# Patient Record
Sex: FEMALE | Race: WHITE | NOT HISPANIC OR LATINO | Employment: UNEMPLOYED | URBAN - METROPOLITAN AREA
[De-identification: names, ages, dates, MRNs, and addresses within clinical notes are randomized per-mention and may not be internally consistent; named-entity substitution may affect disease eponyms.]

---

## 2017-04-13 ENCOUNTER — HOSPITAL ENCOUNTER (OUTPATIENT)
Dept: MAMMOGRAPHY | Facility: MEDICAL CENTER | Age: 47
Discharge: HOME | End: 2017-04-13
Payer: COMMERCIAL

## 2017-04-13 DIAGNOSIS — Z12.31 ENCOUNTER FOR SCREENING MAMMOGRAM FOR MALIGNANT NEOPLASM OF BREAST: ICD-10-CM

## 2017-04-13 PROCEDURE — G0202 SCR MAMMO BI INCL CAD: HCPCS

## 2017-05-16 ENCOUNTER — ALLSCRIPTS OFFICE VISIT (OUTPATIENT)
Dept: OTHER | Facility: OTHER | Age: 47
End: 2017-05-16

## 2017-05-25 ENCOUNTER — GENERIC CONVERSION - ENCOUNTER (OUTPATIENT)
Dept: OTHER | Facility: OTHER | Age: 47
End: 2017-05-25

## 2017-05-25 DIAGNOSIS — D64.9 ANEMIA: ICD-10-CM

## 2018-01-11 NOTE — MISCELLANEOUS
Message   Recorded as Task   Date: 04/19/2016 02:45 PM, Created By: Mary Dover   Task Name: Follow Up   Assigned To: Sariah Green   Regarding Patient: Brandy Jaquez, Status: In Progress   Comment:    Mary Dover - 19 Apr 2016 2:45 PM     TASK CREATED  Caller: Self; General Medical Question; [phone] (Mobile Phone)  pt called - past 3yrs been trying to lose weight & has dropped 20lbs & not yet to her goal weight - has tried everything under the sun to help lose - concerned if something else is happening - on BC pill - did have her thyroid previously checked w/Dr Ward Going - she'd like to discuss further if there is any insights to help her - she will be seeing Ed Going in may for her yearly - just thought give us a shot with answering questions - please eleanor  [phone]   Jyotsna Gordon - 19 Apr 2016 3:11 PM     TASK IN PROGRESS   Jyotsna Gordon - 19 Apr 2016 3:17 PM     TASK EDITED  pt is having great difficulty with weight loss    exercises vigorously    has had metabolism checked  [employer] weight watchers, etc  on gildess    very discouraged    can think of no other reason except the pilll    has yrly sched in may with rk, but does not  want to wait that long    req any further instructions prior to Jyotsna Spicer - 19 Apr 2016 3:18 PM     TASK REASSIGNED: Previously Assigned To Aiden Manzo - 20 Apr 2016 9:16 AM     TASK REPLIED TO: Previously Assigned To Liat Florence  OK to stop OCP   will reassess at annual visit   will need to use condoms   Jyotsna Gordon - 20 Apr 2016 10:01 AM     TASK EDITED  pt astates she will continue pill until after she discusses options at yearly with rk        Active Problems    1  Candidiasis (112 9) (B37 9)   2  Contraceptives (V25 02)   3  Encounter for routine gynecological examination (V72 31) (Z01 419)   4  Encounter for screening mammogram for malignant neoplasm of breast (V76 12)   (Z12 31)   5   Screening for human papillomavirus (HPV) (V73 81) (Z11 51)   6  Weight gain (783 1) (R63 5)   7  Weight loss (783 21) (R63 4)    Current Meds   1  Gildess FE 1 5/30 1 5-30 MG-MCG Oral Tablet; TAKE ONE (1) TABLET daily; Therapy: 90QWD2578 to (Evaluate:13Jun2016)  Requested for: 18Apr2016; Last   Rx:18Apr2016 Ordered   2  Multi-Vitamin Oral Tablet; TAKE 1 TABLET DAILY; Therapy: (Recorded:16Apr2014) to Recorded    Allergies    1   Penicillins    Signatures   Electronically signed by : Parisa Parra, ; Apr 20 2016 10:02AM EST                       (Author)

## 2018-01-11 NOTE — MISCELLANEOUS
Message   Recorded as Task   Date: 05/24/2017 02:58 PM, Created By: Olivia Little   Task Name: Follow Up   Assigned To: Tres Townsend   Regarding Patient: Nimo Lane, Status: Active   CommentLajean Toni - 24 May 2017 2:58 PM     TASK CREATED  inform pt Hgb 10  5  Adrien Ped anemic  To begin OTC iron supplement, iron-rich foods  Keep menstrual calendar  Repeat CBC(no diff) 3 months  Advise Aleve for 2 days prior to menses and through first 2 days to decrease flow  Tres Townsend - 25 May 2017 8:38 AM     TASK EDITED  pt is already taking an iron supplement because her PCP is monitoring her blood work and she already has a slip for repeat blood work from her PCP to be done in August  she will make sure RK gets those results as well        Active Problems    1  Anemia (285 9) (D64 9)   2  Candidiasis (112 9) (B37 9)   3  Contraceptives (V25 02)   4  Dysmenorrhea (625 3) (N94 6)   5  Encounter for gynecological examination with abnormal finding (V72 31) (Z01 411)   6  Encounter for gynecological examination without abnormal finding (V72 31) (Z01 419)   7  Encounter for insertion of ParaGard IUD (V25 11) (Z30 430)   8  Encounter for IUD insertion (V25 11) (Z30 430)   9  Encounter for routine gynecological examination (V72 31) (Z01 419)   10  Encounter for screening mammogram for malignant neoplasm of breast (V76 12)    (Z12 31)   11  Fatigue (780 79) (R53 83)   12  IUD check up (V25 42) (Z30 431)   13  Screening for human papillomavirus (HPV) (V73 81) (Z11 51)   14  Weight gain (783 1) (R63 5)   15  Weight loss (783 21) (R63 4)    Current Meds   1  Multi-Vitamin Oral Tablet; TAKE 1 TABLET DAILY; Therapy: (Recorded:70Ovl4261) to Recorded    Allergies    1  Penicillins    Plan  Anemia    · (1) CBC/ PLT (NO DIFF);  Status:Canceled - Retrospective By Protocol Authorization;     Signatures   Electronically signed by : Murali Freedman, ; May 25 2017  8:39AM EST                       (Author)

## 2018-01-12 VITALS
WEIGHT: 158 LBS | BODY MASS INDEX: 25.39 KG/M2 | DIASTOLIC BLOOD PRESSURE: 82 MMHG | SYSTOLIC BLOOD PRESSURE: 132 MMHG | HEIGHT: 66 IN

## 2018-01-13 NOTE — MISCELLANEOUS
Message   Recorded as Task   Date: 04/14/2016 09:35 AM, Created By: System   Task Name: Rx Renew Request   Assigned To: Karen Polanco   Regarding Patient: Jason Choi, Status: Active   Comment:    System - 14 Apr 2016 9:35 AM     PHARMACY: [facility]  PATIENT: Ventura Robbins  MEDICATION: GILDESS FE 1 5-30 TABLET   Kayy Moran - 18 Apr 2016 8:42 AM     TASK REASSIGNED: Previously Assigned To [facility] - 18 Apr 2016 8:49 AM     TASK REASSIGNED: Previously Assigned To Kinjal Arreguinta - 18 Apr 2016 8:54 AM     TASK EDITED  sent rx to pharm        Active Problems    1  Candidiasis (112 9) (B37 9)   2  Contraceptives (V25 02)   3  Encounter for routine gynecological examination (V72 31) (Z01 419)   4  Encounter for screening mammogram for malignant neoplasm of breast (V76 12)   (Z12 31)   5  Screening for human papillomavirus (HPV) (V73 81) (Z11 51)   6  Weight gain (783 1) (R63 5)   7  Weight loss (783 21) (R63 4)    Current Meds   1  Gildess FE 1 5/30 1 5-30 MG-MCG Oral Tablet; TAKE ONE (1) TABLET daily; Therapy: 50COB8384 to (Evaluate:77Peh8692)  Requested for: 11QPE6268; Last   HY:[id number] Ordered   2  Multi-Vitamin Oral Tablet; TAKE 1 TABLET DAILY; Therapy: (Recorded:63Zcm8764) to Recorded    Allergies    1   Penicillins    Plan  Contraceptives    · Gildess FE 1 5/30 1 5-30 MG-MCG Oral Tablet; TAKE ONE (1) TABLET daily    Signatures   Electronically signed by : Una Richards, ; Apr 18 2016  8:54AM EST                       (Author)

## 2018-01-13 NOTE — MISCELLANEOUS
Message   Recorded as Task   Date: 04/19/2016 02:45 PM, Created By: Kelley Poon   Task Name: Follow Up   Assigned To: Tha Woodward   Regarding Patient: Evelyne Santiago, Status: In Progress   Comment:    Kelley Poon - 19 Apr 2016 2:45 PM     TASK CREATED  Caller: Self; General Medical Question; [phone] (Mobile Phone)  pt called - past 3yrs been trying to lose weight & has dropped 20lbs & not yet to her goal weight - has tried everything under the sun to help lose - concerned if something else is happening - on BC pill - did have her thyroid previously checked w/Dr Savannah Shearer - she'd like to discuss further if there is any insights to help her - she will be seeing Savannah Shearer in may for her yearly - just thought give us a shot with answering questions - please abvise  [phone]   Jyotsna Gordon - 19 Apr 2016 3:11 PM     TASK IN PROGRESS   Jyotsna Gordon - 19 Apr 2016 3:17 PM     TASK EDITED  pt is having great difficulty with weight loss    exercises vigorously    has had metabolism checked  [employer] weight watchers, etc  on gildess    very discouraged    can think of no other reason except the pilll    has yrly sched in may with rk, but does not  want to wait that long    req any further instructions prior to yrly        Active Problems    1  Candidiasis (112 9) (B37 9)   2  Contraceptives (V25 02)   3  Encounter for routine gynecological examination (V72 31) (Z01 419)   4  Encounter for screening mammogram for malignant neoplasm of breast (V76 12)   (Z12 31)   5  Screening for human papillomavirus (HPV) (V73 81) (Z11 51)   6  Weight gain (783 1) (R63 5)   7  Weight loss (783 21) (R63 4)    Current Meds   1  Gildess FE 1 5/30 1 5-30 MG-MCG Oral Tablet; TAKE ONE (1) TABLET daily; Therapy: 62ITJ3877 to (Evaluate:13Jun2016)  Requested for: 18Apr2016; Last   Rx:18Apr2016 Ordered   2  Multi-Vitamin Oral Tablet; TAKE 1 TABLET DAILY; Therapy: (Recorded:16Apr2014) to Recorded    Allergies    1  Penicillins    Signatures   Electronically signed by : Al Sims, ; Apr 19 2016  3:17PM EST                       (Author)

## 2018-01-15 NOTE — MISCELLANEOUS
Message   Recorded as Task   Date: 06/02/2016 03:02 PM, Created By: Janetta Closs   Task Name: Follow Up   Assigned To: Joao Monterroso   Regarding Patient: Denny Patel, Status: In Progress   Art Villalpando - 02 Jun 2016 3:02 PM     TASK CREATED  inform pt labs wnLeatha Bowen - 02 Jun 2016 3:05 PM     TASK IN PROGRESS   Lee Giraldo - 02 Jun 2016 3:07 PM     TASK EDITED  pt made aware of results  Active Problems    1  Candidiasis (112 9) (B37 9)   2  Contraceptives (V25 02)   3  Encounter for gynecological examination with abnormal finding (V72 31) (Z01 411)   4  Encounter for routine gynecological examination (V72 31) (Z01 419)   5  Encounter for screening mammogram for malignant neoplasm of breast (V76 12)   (Z12 31)   6  Fatigue (780 79) (R53 83)   7  Screening for human papillomavirus (HPV) (V73 81) (Z11 51)   8  Weight gain (783 1) (R63 5)   9  Weight loss (783 21) (R63 4)    Current Meds   1  Gildess FE 1 5/30 1 5-30 MG-MCG Oral Tablet; TAKE ONE (1) TABLET daily; Therapy: 12PYC4306 to (Evaluate:13Jun2016)  Requested for: 18Apr2016; Last   Rx:78Hyl4681 Ordered   2  Multi-Vitamin Oral Tablet; TAKE 1 TABLET DAILY; Therapy: (Recorded:96Hfg4209) to Recorded    Allergies    1   Penicillins    Signatures   Electronically signed by : Mika Laura LPN; Jun 2 2183  3:73SV EST                       (Author)

## 2018-03-16 ENCOUNTER — TELEPHONE (OUTPATIENT)
Dept: OBGYN CLINIC | Facility: CLINIC | Age: 48
End: 2018-03-16

## 2018-03-16 NOTE — TELEPHONE ENCOUNTER
Pt called states she found a cyst on the outside of her vagina yesterday  The first opening I see is May 2nd, is there somewhere you can fit her in so she doesn't have to wait till her yearly at the end of May? She does request to see Dr Carol Valadez

## 2018-03-20 NOTE — TELEPHONE ENCOUNTER
Pt thinks its just a blocked hair follicle  She will do warm soaks and see if it gets better on her own  If it doesn't pt will call us back to make an appt

## 2018-04-03 DIAGNOSIS — Z12.31 ENCOUNTER FOR SCREENING MAMMOGRAM FOR MALIGNANT NEOPLASM OF BREAST: ICD-10-CM

## 2018-04-30 ENCOUNTER — HOSPITAL ENCOUNTER (OUTPATIENT)
Dept: MAMMOGRAPHY | Facility: MEDICAL CENTER | Age: 48
Discharge: HOME | End: 2018-04-30
Payer: COMMERCIAL

## 2018-04-30 DIAGNOSIS — Z12.31 ENCOUNTER FOR SCREENING MAMMOGRAM FOR MALIGNANT NEOPLASM OF BREAST: ICD-10-CM

## 2018-04-30 PROCEDURE — 77067 SCR MAMMO BI INCL CAD: CPT

## 2018-05-29 ENCOUNTER — ANNUAL EXAM (OUTPATIENT)
Dept: OBGYN CLINIC | Facility: CLINIC | Age: 48
End: 2018-05-29
Payer: COMMERCIAL

## 2018-05-29 VITALS
SYSTOLIC BLOOD PRESSURE: 128 MMHG | HEIGHT: 67 IN | WEIGHT: 177 LBS | DIASTOLIC BLOOD PRESSURE: 82 MMHG | BODY MASS INDEX: 27.78 KG/M2

## 2018-05-29 DIAGNOSIS — Z12.31 ENCOUNTER FOR SCREENING MAMMOGRAM FOR MALIGNANT NEOPLASM OF BREAST: ICD-10-CM

## 2018-05-29 DIAGNOSIS — Z01.419 ENCOUNTER FOR GYNECOLOGICAL EXAMINATION WITHOUT ABNORMAL FINDING: Primary | ICD-10-CM

## 2018-05-29 PROCEDURE — 99396 PREV VISIT EST AGE 40-64: CPT | Performed by: OBSTETRICS & GYNECOLOGY

## 2018-05-29 RX ORDER — ALBUTEROL SULFATE 90 UG/1
1 AEROSOL, METERED RESPIRATORY (INHALATION) EVERY 6 HOURS
COMMUNITY

## 2018-05-29 NOTE — PROGRESS NOTES
Assessment/Plan:    No problem-specific Assessment & Plan notes found for this encounter  Diagnoses and all orders for this visit:    Encounter for screening mammogram for malignant neoplasm of breast  -     Mammo screening bilateral w cad; Future    Encounter for gynecological examination without abnormal finding    Other orders  -     albuterol (PROVENTIL HFA,VENTOLIN HFA) 90 mcg/act inhaler; Inhale 1 puff every 6 (six) hours  -     COPPER PO; 176 mg by Intrauterine route  -     Multiple Vitamin (MULTI-VITAMIN DAILY PO); Take 1 tablet by mouth daily  -     Iron-Vit C-Vit B12-Folic Acid (IRON 476 PLUS PO); Take 1 tablet by mouth        Annual examination was completed  Mammogram was ordered  We discussed strategies to help with maintenance of normal weight and healthy goals  Patient to return to the office in 1 year or as necessary  Subjective:      Patient ID: Finn Workman is a 52 y o  female  Patient returns for annual gyn visit  She has no new medical or surgical issues  She does have challenges with inability to lose weight  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  Menses are properly timed and normal in flow  Gynecologic Exam         The following portions of the patient's history were reviewed and updated as appropriate:   She  has a past medical history of Exercise-induced asthma and Migraine  She There are no active problems to display for this patient  She  has a past surgical history that includes Dilation and evacuation  Her family history includes Hypertension in her mother; Thyroid disease in her mother  She  reports that she has never smoked  She has never used smokeless tobacco  She reports that she drinks alcohol  She reports that she does not use drugs    Current Outpatient Prescriptions   Medication Sig Dispense Refill    albuterol (PROVENTIL HFA,VENTOLIN HFA) 90 mcg/act inhaler Inhale 1 puff every 6 (six) hours      COPPER  mg by Intrauterine route  Iron-Vit C-Vit B12-Folic Acid (IRON 225 PLUS PO) Take 1 tablet by mouth      Multiple Vitamin (MULTI-VITAMIN DAILY PO) Take 1 tablet by mouth daily       No current facility-administered medications for this visit  No current outpatient prescriptions on file prior to visit  No current facility-administered medications on file prior to visit  She is allergic to penicillins       Review of Systems   All other systems reviewed and are negative  Objective:      /82 (BP Location: Left arm, Patient Position: Sitting, Cuff Size: Standard)   Ht 5' 7" (1 702 m)   Wt 80 3 kg (177 lb)   LMP 05/10/2018   BMI 27 72 kg/m²          Physical Exam   Constitutional: She is oriented to person, place, and time  She appears well-developed and well-nourished  HENT:   Head: Normocephalic and atraumatic  Nose: Nose normal    Eyes: EOM are normal  Pupils are equal, round, and reactive to light  Neck: Normal range of motion  Neck supple  No thyromegaly present  Cardiovascular: Normal rate and regular rhythm  Pulmonary/Chest: Effort normal and breath sounds normal  No respiratory distress  Breasts no masses, tenderness, skin changes   Abdominal: Soft  Bowel sounds are normal  She exhibits no distension and no mass  There is no tenderness  Hernia confirmed negative in the right inguinal area and confirmed negative in the left inguinal area  Genitourinary: Vagina normal and uterus normal  No breast swelling, tenderness, discharge or bleeding  Pelvic exam was performed with patient supine  No labial fusion  There is no rash, tenderness, lesion or injury on the right labia  There is no rash, tenderness, lesion or injury on the left labia  Cervix exhibits no motion tenderness, no discharge and no friability     Genitourinary Comments: Ext genitalia nl female w/o lesions  Vag healthy without lesions or discharge  Cervix healthy w/o lesions or discharge  uterus nl size, NT, no mass  Adnexa NT, no mass Musculoskeletal: Normal range of motion  She exhibits no edema  Lymphadenopathy:        Right: No inguinal adenopathy present  Left: No inguinal adenopathy present  Neurological: She is alert and oriented to person, place, and time  She has normal reflexes  Skin: Skin is warm and dry  No rash noted  Psychiatric: She has a normal mood and affect  Her behavior is normal  Thought content normal    Nursing note and vitals reviewed

## 2019-06-05 ENCOUNTER — ANNUAL EXAM (OUTPATIENT)
Dept: OBGYN CLINIC | Facility: CLINIC | Age: 49
End: 2019-06-05
Payer: COMMERCIAL

## 2019-06-05 VITALS
DIASTOLIC BLOOD PRESSURE: 74 MMHG | BODY MASS INDEX: 29.09 KG/M2 | SYSTOLIC BLOOD PRESSURE: 122 MMHG | HEIGHT: 66 IN | WEIGHT: 181 LBS

## 2019-06-05 DIAGNOSIS — Z01.419 ENCOUNTER FOR GYNECOLOGICAL EXAMINATION (GENERAL) (ROUTINE) WITHOUT ABNORMAL FINDINGS: Primary | ICD-10-CM

## 2019-06-05 DIAGNOSIS — Z12.4 CERVICAL CANCER SCREENING: ICD-10-CM

## 2019-06-05 DIAGNOSIS — Z11.51 SCREENING FOR HUMAN PAPILLOMAVIRUS (HPV): ICD-10-CM

## 2019-06-05 DIAGNOSIS — Z12.39 SCREENING FOR MALIGNANT NEOPLASM OF BREAST: ICD-10-CM

## 2019-06-05 PROCEDURE — G0145 SCR C/V CYTO,THINLAYER,RESCR: HCPCS | Performed by: OBSTETRICS & GYNECOLOGY

## 2019-06-05 PROCEDURE — 99396 PREV VISIT EST AGE 40-64: CPT | Performed by: OBSTETRICS & GYNECOLOGY

## 2019-06-05 PROCEDURE — 87624 HPV HI-RISK TYP POOLED RSLT: CPT | Performed by: OBSTETRICS & GYNECOLOGY

## 2019-06-06 LAB
HPV HR 12 DNA CVX QL NAA+PROBE: NEGATIVE
HPV16 DNA CVX QL NAA+PROBE: NEGATIVE
HPV18 DNA CVX QL NAA+PROBE: NEGATIVE

## 2019-06-10 LAB
LAB AP GYN PRIMARY INTERPRETATION: NORMAL
LAB AP LMP: NORMAL
Lab: NORMAL

## 2019-06-13 ENCOUNTER — TELEPHONE (OUTPATIENT)
Dept: OBGYN CLINIC | Facility: CLINIC | Age: 49
End: 2019-06-13

## 2019-08-13 ENCOUNTER — HOSPITAL ENCOUNTER (OUTPATIENT)
Dept: MAMMOGRAPHY | Facility: MEDICAL CENTER | Age: 49
Discharge: HOME | End: 2019-08-13
Payer: COMMERCIAL

## 2019-08-13 VITALS — WEIGHT: 178 LBS | HEIGHT: 66 IN | BODY MASS INDEX: 28.61 KG/M2

## 2019-08-13 DIAGNOSIS — Z12.39 SCREENING FOR MALIGNANT NEOPLASM OF BREAST: ICD-10-CM

## 2019-08-13 PROCEDURE — 77067 SCR MAMMO BI INCL CAD: CPT
